# Patient Record
Sex: FEMALE | Race: WHITE | Employment: FULL TIME | URBAN - NONMETROPOLITAN AREA
[De-identification: names, ages, dates, MRNs, and addresses within clinical notes are randomized per-mention and may not be internally consistent; named-entity substitution may affect disease eponyms.]

---

## 2024-10-29 ENCOUNTER — HOSPITAL ENCOUNTER (EMERGENCY)
Age: 54
Discharge: HOME | End: 2024-10-29
Attending: EMERGENCY MEDICINE
Payer: COMMERCIAL

## 2024-10-29 VITALS
TEMPERATURE: 97.4 F | HEIGHT: 66 IN | OXYGEN SATURATION: 96 % | DIASTOLIC BLOOD PRESSURE: 95 MMHG | HEART RATE: 81 BPM | BODY MASS INDEX: 33.28 KG/M2 | WEIGHT: 207.1 LBS | SYSTOLIC BLOOD PRESSURE: 150 MMHG | RESPIRATION RATE: 18 BRPM

## 2024-10-29 DIAGNOSIS — G43.809 OTHER MIGRAINE WITHOUT STATUS MIGRAINOSUS, NOT INTRACTABLE: Primary | ICD-10-CM

## 2024-10-29 PROCEDURE — 96374 THER/PROPH/DIAG INJ IV PUSH: CPT

## 2024-10-29 PROCEDURE — 6360000002 HC RX W HCPCS: Performed by: EMERGENCY MEDICINE

## 2024-10-29 PROCEDURE — 99284 EMERGENCY DEPT VISIT MOD MDM: CPT

## 2024-10-29 RX ORDER — LIRAGLUTIDE 6 MG/ML
1.2 INJECTION SUBCUTANEOUS DAILY
COMMUNITY

## 2024-10-29 RX ORDER — INDOMETHACIN 50 MG/1
50 CAPSULE ORAL 2 TIMES DAILY PRN
COMMUNITY

## 2024-10-29 RX ORDER — CETIRIZINE HYDROCHLORIDE 10 MG/1
10 TABLET ORAL DAILY
COMMUNITY

## 2024-10-29 RX ORDER — BUPROPION HYDROCHLORIDE 300 MG/1
300 TABLET ORAL EVERY MORNING
COMMUNITY

## 2024-10-29 RX ORDER — AMLODIPINE BESYLATE 5 MG/1
5 TABLET ORAL DAILY
COMMUNITY

## 2024-10-29 RX ORDER — BUSPIRONE HYDROCHLORIDE 15 MG/1
15 TABLET ORAL 3 TIMES DAILY
COMMUNITY

## 2024-10-29 RX ORDER — LISINOPRIL 40 MG/1
40 TABLET ORAL DAILY
COMMUNITY

## 2024-10-29 RX ORDER — METOCLOPRAMIDE HYDROCHLORIDE 5 MG/ML
20 INJECTION INTRAMUSCULAR; INTRAVENOUS ONCE
Status: COMPLETED | OUTPATIENT
Start: 2024-10-29 | End: 2024-10-29

## 2024-10-29 RX ADMIN — METOCLOPRAMIDE 20 MG: 5 INJECTION, SOLUTION INTRAMUSCULAR; INTRAVENOUS at 14:36

## 2024-10-29 ASSESSMENT — LIFESTYLE VARIABLES
HOW OFTEN DO YOU HAVE A DRINK CONTAINING ALCOHOL: NEVER
HOW MANY STANDARD DRINKS CONTAINING ALCOHOL DO YOU HAVE ON A TYPICAL DAY: PATIENT DOES NOT DRINK

## 2024-10-29 ASSESSMENT — ENCOUNTER SYMPTOMS
SHORTNESS OF BREATH: 0
CHEST TIGHTNESS: 0

## 2024-10-29 ASSESSMENT — PAIN - FUNCTIONAL ASSESSMENT: PAIN_FUNCTIONAL_ASSESSMENT: 0-10

## 2024-10-29 ASSESSMENT — PAIN SCALES - GENERAL: PAINLEVEL_OUTOF10: 9

## 2024-10-29 ASSESSMENT — PAIN DESCRIPTION - FREQUENCY: FREQUENCY: INTERMITTENT

## 2024-10-29 ASSESSMENT — PAIN DESCRIPTION - LOCATION: LOCATION: HEAD

## 2024-10-29 ASSESSMENT — PAIN DESCRIPTION - DESCRIPTORS: DESCRIPTORS: ACHING

## 2024-10-29 ASSESSMENT — PAIN DESCRIPTION - PAIN TYPE: TYPE: ACUTE PAIN

## 2024-10-29 NOTE — ED PROVIDER NOTES
busPIRone (BUSPAR) 15 MG tablet Take 15 mg by mouth 3 times dailyHistorical Med      amLODIPine (NORVASC) 5 MG tablet Take 1 tablet by mouth dailyHistorical Med      indomethacin (INDOCIN) 50 MG capsule Take 1 capsule by mouth 2 times daily as needed for PainHistorical Med             ALLERGIES       Patient has no known allergies.    FAMILY HISTORY       History reviewed. No pertinent family history.       SOCIAL HISTORY       Social History     Tobacco Use    Smoking status: Never    Smokeless tobacco: Never         PHYSICAL EXAM       ED Triage Vitals [10/29/24 1402]   BP Systolic BP Percentile Diastolic BP Percentile Temp Temp Source Pulse Respirations SpO2   (!) 150/95 -- -- 97.4 °F (36.3 °C) Oral 81 18 96 %      Height Weight - Scale         1.676 m (5' 6\") 93.9 kg (207 lb 1.6 oz)             Physical Exam  Constitutional:       Appearance: Normal appearance. She is obese.   HENT:      Head: Normocephalic and atraumatic.      Nose: Nose normal.      Mouth/Throat:      Mouth: Mucous membranes are dry.   Cardiovascular:      Rate and Rhythm: Normal rate and regular rhythm.      Pulses: Normal pulses.   Pulmonary:      Effort: No respiratory distress.      Breath sounds: Normal breath sounds.   Abdominal:      General: There is no distension.      Palpations: Abdomen is soft.      Tenderness: There is no abdominal tenderness.   Musculoskeletal:         General: No swelling, tenderness or deformity.      Cervical back: Normal range of motion.   Skin:     General: Skin is warm.      Coloration: Skin is not jaundiced.   Neurological:      General: No focal deficit present.      Mental Status: She is alert and oriented to person, place, and time.      Cranial Nerves: No cranial nerve deficit.      Motor: No weakness.         DIAGNOSTIC RESULTS     EKG:     RADIOLOGY:         Interpretation per the Radiologist below, if available at the time of this note:    No orders to display         LABS:  Labs Reviewed - No

## 2025-04-21 ENCOUNTER — OFFICE VISIT (OUTPATIENT)
Dept: NEUROLOGY | Age: 55
End: 2025-04-21
Payer: COMMERCIAL

## 2025-04-21 VITALS
TEMPERATURE: 96.1 F | DIASTOLIC BLOOD PRESSURE: 81 MMHG | WEIGHT: 220.2 LBS | SYSTOLIC BLOOD PRESSURE: 139 MMHG | HEIGHT: 66 IN | BODY MASS INDEX: 35.39 KG/M2 | RESPIRATION RATE: 20 BRPM | HEART RATE: 89 BPM

## 2025-04-21 DIAGNOSIS — G43.719 INTRACTABLE CHRONIC MIGRAINE WITHOUT AURA AND WITHOUT STATUS MIGRAINOSUS: Primary | ICD-10-CM

## 2025-04-21 DIAGNOSIS — G44.209 MUSCLE CONTRACTION HEADACHE: ICD-10-CM

## 2025-04-21 PROCEDURE — G8417 CALC BMI ABV UP PARAM F/U: HCPCS | Performed by: NEUROMUSCULOSKELETAL MEDICINE, SPORTS MEDICINE

## 2025-04-21 PROCEDURE — G8427 DOCREV CUR MEDS BY ELIG CLIN: HCPCS | Performed by: NEUROMUSCULOSKELETAL MEDICINE, SPORTS MEDICINE

## 2025-04-21 PROCEDURE — 99204 OFFICE O/P NEW MOD 45 MIN: CPT | Performed by: NEUROMUSCULOSKELETAL MEDICINE, SPORTS MEDICINE

## 2025-04-21 PROCEDURE — 3017F COLORECTAL CA SCREEN DOC REV: CPT | Performed by: NEUROMUSCULOSKELETAL MEDICINE, SPORTS MEDICINE

## 2025-04-21 PROCEDURE — 1036F TOBACCO NON-USER: CPT | Performed by: NEUROMUSCULOSKELETAL MEDICINE, SPORTS MEDICINE

## 2025-04-21 RX ORDER — GABAPENTIN 100 MG/1
100 CAPSULE ORAL 3 TIMES DAILY
Qty: 90 CAPSULE | Refills: 0 | Status: SHIPPED | OUTPATIENT
Start: 2025-04-21 | End: 2025-05-21

## 2025-04-21 NOTE — PROGRESS NOTES
Spasm: absent, involuntary movement: absent, Speech difficulty: present, Headache: present, Light sensitivity: present   Psychiatric Anxiety: present, Depression  present, drug abuse: absent, Hallucination: absent, mood disorder: absent, Suicidal ideations absent   Hematologic Abnormal bleeding: absent, Anemia: absent, Lymph gland changes: absent Clotting disorder: absent     Past Medical History:   Diagnosis Date    Asthma     Hypertension     Migraine      History reviewed. No pertinent surgical history.    Social History     Socioeconomic History    Marital status:      Spouse name: Not on file    Number of children: Not on file    Years of education: Not on file    Highest education level: Not on file   Occupational History    Not on file   Tobacco Use    Smoking status: Never    Smokeless tobacco: Never   Vaping Use    Vaping status: Never Used   Substance and Sexual Activity    Alcohol use: Not Currently    Drug use: Never    Sexual activity: Not on file   Other Topics Concern    Not on file   Social History Narrative    Not on file     Social Drivers of Health     Financial Resource Strain: Not on file   Food Insecurity: Not on file   Transportation Needs: Not on file   Physical Activity: Not on file   Stress: Not on file   Social Connections: Not on file   Intimate Partner Violence: Not on file   Housing Stability: Not on file       History reviewed. No pertinent family history.    Current Outpatient Medications   Medication Sig Dispense Refill    gabapentin (NEURONTIN) 100 MG capsule Take 1 capsule by mouth 3 times daily for 30 days. Intended supply: 90 days 90 capsule 0    lisinopril (PRINIVIL;ZESTRIL) 40 MG tablet Take 1 tablet by mouth daily      omeprazole (PRILOSEC) 20 MG delayed release capsule Take 2 capsules by mouth daily      FLUoxetine (PROZAC) 20 MG capsule Take 2 capsules by mouth daily      cetirizine (ZYRTEC) 10 MG tablet Take 1 tablet by mouth daily      buPROPion (WELLBUTRIN XL) 300

## 2025-04-21 NOTE — PATIENT INSTRUCTIONS
SURVEY:    Thank you for allowing us to care for you today.    You may be receiving a survey from [facility] regarding your visit today- electronically or via mail.      Please help us by completing the survey as this will provide the needed feedback to ensure we are providing the very best care for you and your family.    If you cannot score us a very good on any question, please call the office to discuss how we could have made your experience a very good one.    Thank you.       STAFF:    Ludmila Myers, Anjana MARTIN      CLINICAL STAFF:    Antonella RAJAN, Mildred MARTIN, Isabella MARTIN, Catherine RAJAN

## 2025-05-23 ENCOUNTER — TELEPHONE (OUTPATIENT)
Dept: NEUROLOGY | Age: 55
End: 2025-05-23

## 2025-05-23 NOTE — TELEPHONE ENCOUNTER
Fely called in and left voicemail message that she was prescribed gabapentin for the tension migraines.  Reports that it seemed to help for about 2 weeks and now her issues are back.  She would like to know if dose can be increased or if there is something else that can be done to get rid of headaches.    Please advise.

## 2025-05-28 RX ORDER — GABAPENTIN 100 MG/1
200 CAPSULE ORAL 3 TIMES DAILY
Qty: 180 CAPSULE | Refills: 0 | Status: SHIPPED | OUTPATIENT
Start: 2025-05-28 | End: 2025-06-27

## 2025-05-29 NOTE — TELEPHONE ENCOUNTER
Left voicemail message that new Rx was sent and informed to take 2 capsules three times a day and follow up in June.  Reminded next appointment is 6/23/25.

## 2025-06-23 ENCOUNTER — OFFICE VISIT (OUTPATIENT)
Dept: NEUROLOGY | Age: 55
End: 2025-06-23
Payer: COMMERCIAL

## 2025-06-23 VITALS
TEMPERATURE: 97.1 F | BODY MASS INDEX: 36.65 KG/M2 | WEIGHT: 220 LBS | RESPIRATION RATE: 18 BRPM | SYSTOLIC BLOOD PRESSURE: 136 MMHG | HEART RATE: 72 BPM | HEIGHT: 65 IN | DIASTOLIC BLOOD PRESSURE: 88 MMHG

## 2025-06-23 DIAGNOSIS — G44.209 MUSCLE CONTRACTION HEADACHE: ICD-10-CM

## 2025-06-23 DIAGNOSIS — G43.719 INTRACTABLE CHRONIC MIGRAINE WITHOUT AURA AND WITHOUT STATUS MIGRAINOSUS: Primary | ICD-10-CM

## 2025-06-23 PROCEDURE — G8427 DOCREV CUR MEDS BY ELIG CLIN: HCPCS | Performed by: NEUROMUSCULOSKELETAL MEDICINE, SPORTS MEDICINE

## 2025-06-23 PROCEDURE — G8417 CALC BMI ABV UP PARAM F/U: HCPCS | Performed by: NEUROMUSCULOSKELETAL MEDICINE, SPORTS MEDICINE

## 2025-06-23 PROCEDURE — 1036F TOBACCO NON-USER: CPT | Performed by: NEUROMUSCULOSKELETAL MEDICINE, SPORTS MEDICINE

## 2025-06-23 PROCEDURE — 3017F COLORECTAL CA SCREEN DOC REV: CPT | Performed by: NEUROMUSCULOSKELETAL MEDICINE, SPORTS MEDICINE

## 2025-06-23 PROCEDURE — 99213 OFFICE O/P EST LOW 20 MIN: CPT | Performed by: NEUROMUSCULOSKELETAL MEDICINE, SPORTS MEDICINE

## 2025-06-23 RX ORDER — SEMAGLUTIDE 0.68 MG/ML
0.25 INJECTION, SOLUTION SUBCUTANEOUS WEEKLY
COMMUNITY
Start: 2025-05-21

## 2025-06-23 RX ORDER — TOPIRAMATE 25 MG/1
25 TABLET, FILM COATED ORAL NIGHTLY
COMMUNITY
Start: 2025-06-18

## 2025-06-23 RX ORDER — GABAPENTIN 300 MG/1
300 CAPSULE ORAL 3 TIMES DAILY
Qty: 90 CAPSULE | Refills: 3 | Status: SHIPPED | OUTPATIENT
Start: 2025-06-23 | End: 2025-07-23

## 2025-06-23 NOTE — PATIENT INSTRUCTIONS
SURVEY:    Thank you for allowing us to care for you today.    You may be receiving a survey from [facility] regarding your visit today- electronically or via mail.      Please help us by completing the survey as this will provide the needed feedback to ensure we are providing the very best care for you and your family.    If you cannot score us a very good on any question, please call the office to discuss how we could have made your experience a very good one.    Thank you.       STAFF:    Ludmila Myers, Anjana MARTIN      CLINICAL STAFF:    Antonella RAJAN, Mildred MARTIN, Isabella MARTIN, Catherine RAJAN

## 2025-06-23 NOTE — PROGRESS NOTES
NEUROLOGY follow-up.      Patient Name:  Fely Christianson  :   1970  Clinic Visit Date: 2025    I saw Ms. Fely Christianson  in the neurology clinic today for follow up of chronic tension headaches and migraine. 54-year-old right-handed lady with hypertension, depression, history of recurrent trivial head trauma over the past many years, presents with chronic headaches since .  She had tried a number of medications in the past including topiramate and amitriptyline  without any relief.  Started on gabapentin in April of this year.  Doing much better on gabapentin 200 mg 3 times daily.  No side effects.  Headaches have improved in severity, frequency and duration.  She was started on topiramate by her psychiatrist a few days ago before the headaches..  She sees a pain management physician for back pain    REVIEW OF SYSTEMS    Constitutional Weight changes: absent, change in appetite: absent Fatigue: present;Fevers : absent, Any recent hospitalizations:  absent   HEENT Ears: normal,  Visual disturbance: absent   Respiratory Shortness of breath: absent, choking:  absent, Cough: absent, Snoring : present   Cardiovascular Chest pain: absent, Leg swelling :absent, palpitations : absent, fainting : absent   GI Constipation: absent, Diarrhea: absent, Swallowing change: absent    Urinary frequency: absent, Urinary urgency: absent, Urinary incontinence: absent   Musculoskeletal Neck pain: present, Back pain: present, Stiffness: present, Muscle pain: absent, Joint pain: absent, restless leg : absent   Dermatological Hair loss: absent, Skin changes: absent   Neurological Confusion: present, Trouble concentrating: present, Seizures: absent;  Memory loss: present, balance problem: absent, Dizziness: absent, vertigo: absent, Weakness: absent, Numbness absent, Tremor: absent, Spasm: absent, involuntary movement: absent, Speech difficulty: absent, Headache: present, Light sensitivity: present   Psychiatric Anxiety: